# Patient Record
Sex: FEMALE | Race: BLACK OR AFRICAN AMERICAN | NOT HISPANIC OR LATINO | URBAN - METROPOLITAN AREA
[De-identification: names, ages, dates, MRNs, and addresses within clinical notes are randomized per-mention and may not be internally consistent; named-entity substitution may affect disease eponyms.]

---

## 2022-12-26 ENCOUNTER — OFFICE VISIT (OUTPATIENT)
Dept: URGENT CARE | Facility: CLINIC | Age: 52
End: 2022-12-26
Payer: MEDICARE

## 2022-12-26 VITALS
TEMPERATURE: 99 F | SYSTOLIC BLOOD PRESSURE: 118 MMHG | HEIGHT: 67 IN | WEIGHT: 240 LBS | OXYGEN SATURATION: 100 % | RESPIRATION RATE: 18 BRPM | BODY MASS INDEX: 37.67 KG/M2 | HEART RATE: 77 BPM | DIASTOLIC BLOOD PRESSURE: 68 MMHG

## 2022-12-26 DIAGNOSIS — H01.004 BLEPHARITIS OF LEFT UPPER EYELID, UNSPECIFIED TYPE: Primary | ICD-10-CM

## 2022-12-26 DIAGNOSIS — H57.89 IRRITATION OF LEFT EYE: ICD-10-CM

## 2022-12-26 PROCEDURE — 99204 OFFICE O/P NEW MOD 45 MIN: CPT | Mod: S$GLB,,, | Performed by: PHYSICIAN ASSISTANT

## 2022-12-26 PROCEDURE — 99204 PR OFFICE/OUTPT VISIT, NEW, LEVL IV, 45-59 MIN: ICD-10-PCS | Mod: S$GLB,,, | Performed by: PHYSICIAN ASSISTANT

## 2022-12-26 RX ORDER — ERYTHROMYCIN 5 MG/G
OINTMENT OPHTHALMIC
Qty: 1 G | Refills: 0 | Status: SHIPPED | OUTPATIENT
Start: 2022-12-26 | End: 2023-01-02

## 2022-12-26 NOTE — PROGRESS NOTES
"Subjective:       Patient ID: Anya Caldwell is a 52 y.o. female.    Vitals:  height is 5' 7" (1.702 m) and weight is 108.9 kg (240 lb). Her oral temperature is 98.8 °F (37.1 °C). Her blood pressure is 94/46 (abnormal) and her pulse is 80. Her respiration is 18 and oxygen saturation is 100%.     Chief Complaint: Eye Problem    Anya Caldwell is a 52 y.o presenting to urgent care with eye concerns. She mentions 2 days ago she felt a gritty feeling in her left eye, and this morning she reports eye swelling, and itchiness, and a foreign body sensation. She denies any trauma to the eye, and reports she has been taking antihistamines.  No vision changes.    Eye Problem   The left eye is affected. The current episode started yesterday. Associated symptoms include a foreign body sensation and itching. Pertinent negatives include no blurred vision, eye discharge, double vision, eye redness, photophobia or recent URI. She has tried nothing for the symptoms. The treatment provided no relief.     Eyes:  Positive for eye itching and eyelid swelling. Negative for eye discharge, eye redness, photophobia, double vision and blurred vision.     Objective:      Physical Exam   Constitutional: She is oriented to person, place, and time. She appears well-developed.   HENT:   Head: Normocephalic and atraumatic.   Ears:   Right Ear: External ear normal.   Left Ear: External ear normal.   Nose: Nose normal.   Mouth/Throat: Oropharynx is clear and moist. Mucous membranes are moist.   Eyes: Conjunctivae, EOM and lids are normal. Pupils are equal, round, and reactive to light. Lids are everted and swept, no foreign bodies found.   Slit lamp exam:       The left eye shows no corneal abrasion and no fluorescein uptake. vision grossly intact      Comments: Left upper eyelid with swelling.  No erythema present.   Neck: Neck supple.   Cardiovascular: Normal rate, regular rhythm, normal heart sounds and normal pulses.   Pulmonary/Chest: Effort normal " and breath sounds normal.   Musculoskeletal: Normal range of motion.         General: Normal range of motion.   Neurological: She is alert and oriented to person, place, and time.   Skin: Skin is warm and dry.   Vitals reviewed.      Assessment:       1. Blepharitis of left upper eyelid, unspecified type    2. Irritation of left eye          Plan:         Blepharitis of left upper eyelid, unspecified type  -     erythromycin (ROMYCIN) ophthalmic ointment; Apply 1/2 inch ribbon to conjunctival sac of affected eye four times a day for 5-7 days.  Dispense: 1 g; Refill: 0    Irritation of left eye  -     fluorescein-benoxinate 0.25-0.4% ophthalmic solution 1 drop       Erythromycin ointment sent to pharmacy.  Re-assured patient.  Advised to apply warm compresses to the lid 2-3 times per day.  Follow up with Ophthalmology in 1-2 weeks.  RTC or go to the ER with new or worsening symptoms.

## 2025-07-27 ENCOUNTER — HOSPITAL ENCOUNTER (EMERGENCY)
Facility: HOSPITAL | Age: 55
Discharge: HOME | End: 2025-07-27
Attending: EMERGENCY MEDICINE
Payer: MEDICARE

## 2025-07-27 VITALS
OXYGEN SATURATION: 99 % | BODY MASS INDEX: 38.03 KG/M2 | HEART RATE: 73 BPM | TEMPERATURE: 99 F | HEIGHT: 66 IN | WEIGHT: 236.63 LBS | RESPIRATION RATE: 16 BRPM | SYSTOLIC BLOOD PRESSURE: 108 MMHG | DIASTOLIC BLOOD PRESSURE: 57 MMHG

## 2025-07-27 DIAGNOSIS — M54.2 NECK PAIN: ICD-10-CM

## 2025-07-27 DIAGNOSIS — S16.1XXA NECK STRAIN, INITIAL ENCOUNTER: Primary | ICD-10-CM

## 2025-07-27 DIAGNOSIS — S39.012A LUMBAR STRAIN, INITIAL ENCOUNTER: ICD-10-CM

## 2025-07-27 PROCEDURE — 99283 EMERGENCY DEPT VISIT LOW MDM: CPT | Mod: 25

## 2025-07-27 RX ORDER — BUTALBITAL, ACETAMINOPHEN AND CAFFEINE 50; 325; 40 MG/1; MG/1; MG/1
1 TABLET ORAL EVERY 6 HOURS PRN
Qty: 15 TABLET | Refills: 0 | Status: SHIPPED | OUTPATIENT
Start: 2025-07-27

## 2025-07-27 RX ORDER — BUTALBITAL, ACETAMINOPHEN AND CAFFEINE 50; 325; 40 MG/1; MG/1; MG/1
1 TABLET ORAL EVERY 6 HOURS PRN
Qty: 15 TABLET | Refills: 0 | Status: SHIPPED | OUTPATIENT
Start: 2025-07-27 | End: 2025-07-27
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
